# Patient Record
Sex: FEMALE | Race: WHITE | URBAN - METROPOLITAN AREA
[De-identification: names, ages, dates, MRNs, and addresses within clinical notes are randomized per-mention and may not be internally consistent; named-entity substitution may affect disease eponyms.]

---

## 2023-07-03 ENCOUNTER — OFFICE VISIT (OUTPATIENT)
Facility: LOCATION | Age: 78
End: 2023-07-03
Payer: MEDICARE

## 2023-07-03 DIAGNOSIS — H35.3222 EXUDATIVE MACULAR DEGENERATION, WITH INACTIVE CHOROIDAL NEOVASCULARIZATION, LEFT EYE: ICD-10-CM

## 2023-07-03 DIAGNOSIS — H35.3110 NONEXUDATIVE AGE-RELATED MACULAR DEGENERATION,`RIGHT`EYE, STAGE UNSPECIFIED: Primary | ICD-10-CM

## 2023-07-03 PROCEDURE — 92134 CPTRZ OPH DX IMG PST SGM RTA: CPT | Performed by: OPHTHALMOLOGY

## 2023-07-03 PROCEDURE — 92014 COMPRE OPH EXAM EST PT 1/>: CPT | Performed by: OPHTHALMOLOGY

## 2023-07-03 ASSESSMENT — INTRAOCULAR PRESSURE
OD: 11
OS: 11

## 2023-07-03 NOTE — IMPRESSION/PLAN
Impression: Exudative macular degeneration, with inactive choroidal neovascularization, left eye: H35.3222. Plan: Wet Macular Degeneration OS - Discussed disease process with patient. Recommended AREDS2 vitamins and Amsler grid monitoring daily. Discussed avoiding cigarette smoke, including second-hand smoke.  
-s/p avastin x 15, s/p eylea x6, disciform scar - end stage Oct stable

## 2023-07-03 NOTE — IMPRESSION/PLAN
Impression: Nonexudative age-related macular degeneration,`RIGHT`eye, stage unspecified. Plan: Dry Macular Degeneration OD - Discussed disease process with patient. Recommended AREDS2 vitamins and Amsler grid monitoring daily. Discussed avoiding cigarette smoke, including second-hand smoke. Return immediately for any worsening symptoms or change in vision.

## 2023-10-03 ENCOUNTER — OFFICE VISIT (OUTPATIENT)
Facility: LOCATION | Age: 78
End: 2023-10-03
Payer: MEDICARE

## 2023-10-03 DIAGNOSIS — H35.3222 EXUDATIVE MACULAR DEGENERATION, WITH INACTIVE CHOROIDAL NEOVASCULARIZATION, LEFT EYE: Primary | ICD-10-CM

## 2023-10-03 DIAGNOSIS — H04.123 DRY EYE SYNDROME BIL LACRIML GLANDS: ICD-10-CM

## 2023-10-03 DIAGNOSIS — H52.13 MYOPIA, BILATERAL: ICD-10-CM

## 2023-10-03 DIAGNOSIS — H35.3110 NONEXUDATIVE AGE-RELATED MACULAR DEGENERATION,`RIGHT`EYE, STAGE UNSPECIFIED: ICD-10-CM

## 2023-10-03 PROCEDURE — 92134 CPTRZ OPH DX IMG PST SGM RTA: CPT | Performed by: OPHTHALMOLOGY

## 2023-10-03 PROCEDURE — 92014 COMPRE OPH EXAM EST PT 1/>: CPT | Performed by: OPHTHALMOLOGY

## 2023-10-03 ASSESSMENT — KERATOMETRY
OS: 43.19
OD: 42.87

## 2023-10-03 ASSESSMENT — INTRAOCULAR PRESSURE
OD: 13
OS: 13

## 2023-10-03 ASSESSMENT — VISUAL ACUITY: OD: 20/20

## 2024-01-09 ENCOUNTER — OFFICE VISIT (OUTPATIENT)
Facility: LOCATION | Age: 79
End: 2024-01-09
Payer: MEDICARE

## 2024-01-09 DIAGNOSIS — H35.3110 NONEXUDATIVE AGE-RELATED MACULAR DEGENERATION,`RIGHT`EYE, STAGE UNSPECIFIED: ICD-10-CM

## 2024-01-09 DIAGNOSIS — H04.123 DRY EYE SYNDROME BIL LACRIML GLANDS: ICD-10-CM

## 2024-01-09 DIAGNOSIS — H35.3222 EXUDATIVE MACULAR DEGENERATION, WITH INACTIVE CHOROIDAL NEOVASCULARIZATION, LEFT EYE: Primary | ICD-10-CM

## 2024-01-09 PROCEDURE — 92014 COMPRE OPH EXAM EST PT 1/>: CPT | Performed by: OPHTHALMOLOGY

## 2024-01-09 PROCEDURE — 92134 CPTRZ OPH DX IMG PST SGM RTA: CPT | Performed by: OPHTHALMOLOGY

## 2024-01-09 ASSESSMENT — INTRAOCULAR PRESSURE
OD: 13
OS: 13